# Patient Record
(demographics unavailable — no encounter records)

---

## 2024-12-11 NOTE — DISCUSSION/SUMMARY
[FreeTextEntry1] : Assessment - The patient experienced an episode of extreme fatigue and dyspnea, which led to hospitalization. The cause of these symptoms remains unclear, as no heart attack or arrhythmia was detected, and capsule endoscopy did not reveal any bleeding source. - Anemia was noted, but the underlying cause is still undetermined. The patient received a blood transfusion and intravenous magnesium during hospitalization. - There is a mention of congestive heart failure (CHF) in the patient's chart, but it is considered a result of another underlying issue, not a primary diagnosis. - The patient has a history of mitral valve prolapse with moderate mitral regurgitation and mitral annular calcification, but these are not identified as the direct cause of the current symptoms. - The Watchman device is well-anchored with a small communication between the device and the lateral wall of the left atrium, which is expected post-procedure. - The cardiologist plans to refer the patient to a heart failure specialist for further evaluation, as the episodes of dyspnea and fatigue have occurred multiple times without a clear explanation.  Plan - Discontinue sodium tablets and amlodipine as current blood pressure is stable. - Conduct blood work to monitor serum sodium levels in a couple of weeks. - Perform blood tests to check iron level and ferritin to assess anemia status. - Refer to Dr. Koehler in hematology for further evaluation and management of anemia. - Refer to Dr. Alejandra Green, a heart failure expert, for a comprehensive evaluation of recurrent episodes of shortness of breath and fatigue. - Ensure proper monitoring of the loop recorder to assess any potential arrhythmias or other cardiac events. - Consider re-interrogation of the pacemaker to ensure its proper functioning and to rule out any issues. - Initiate a new medication regimen to address current symptoms and evaluate its effectiveness in alleviating shortness of breath and fatigue.  Prescription - Discontinue amlodipine 2.5 mg - Discontinue Sodium tablets 1 g twice a day - Monitor serum sodium levels with blood work in a couple of weeks - Be aware of potential diarrhea from excessive magnesium intake  Appointments - Referral to Dr. Alejandra Green, heart failure expert at [place], for further evaluation.  My cumulative time spent on today's visit included: Preparation for the visit, review of the medical records, review of pertinent diagnostic studies, review and integration of laboratory data, coordination of care, examination and counseling of the patient on the above diagnosis, treatment plan and prognosis, orders of diagnostic tests and any additional lab data ordered today, medications and/or appropriate procedures and documentation in the medical records of today's visit.  ICD-10 codes (8) - Anemia, unspecified [D64.9] - Heart failure, unspecified [I50.9] - Nonrheumatic mitral (valve) prolapse [I34.1] - Nonrheumatic mitral (valve) annulus calcification [I34.81] - Nonrheumatic mitral (valve) insufficiency [I34.0] - Nonrheumatic tricuspid (valve) insufficiency [I36.1] - Essential (primary) hypertension [I10] - Hypothyroidism, unspecified [E03.9]

## 2024-12-11 NOTE — DISCUSSION/SUMMARY
[FreeTextEntry1] : Assessment - The patient experienced an episode of extreme fatigue and dyspnea, which led to hospitalization. The cause of these symptoms remains unclear, as no heart attack or arrhythmia was detected, and capsule endoscopy did not reveal any bleeding source. - Anemia was noted, but the underlying cause is still undetermined. The patient received a blood transfusion and intravenous magnesium during hospitalization. - There is a mention of congestive heart failure (CHF) in the patient's chart, but it is considered a result of another underlying issue, not a primary diagnosis. - The patient has a history of mitral valve prolapse with moderate mitral regurgitation and mitral annular calcification, but these are not identified as the direct cause of the current symptoms. - The Watchman device is well-anchored with a small communication between the device and the lateral wall of the left atrium, which is expected post-procedure. - The cardiologist plans to refer the patient to a heart failure specialist for further evaluation, as the episodes of dyspnea and fatigue have occurred multiple times without a clear explanation.  Plan - Discontinue sodium tablets and amlodipine as current blood pressure is stable. - Conduct blood work to monitor serum sodium levels in a couple of weeks. - Perform blood tests to check iron level and ferritin to assess anemia status. - Refer to Dr. Koehler in hematology for further evaluation and management of anemia. - Refer to Dr. Alejandra Green, a heart failure expert, for a comprehensive evaluation of recurrent episodes of shortness of breath and fatigue. - Ensure proper monitoring of the loop recorder to assess any potential arrhythmias or other cardiac events. - Consider re-interrogation of the pacemaker to ensure its proper functioning and to rule out any issues. - Initiate a new medication regimen to address current symptoms and evaluate its effectiveness in alleviating shortness of breath and fatigue.  Prescription - Discontinue amlodipine 2.5 mg - Discontinue Sodium tablets 1 g twice a day - Monitor serum sodium levels with blood work in a couple of weeks - Be aware of potential diarrhea from excessive magnesium intake  Appointments - Referral to Dr. Alejandra Green, heart failure expert at [place], for further evaluation.  My cumulative time spent on today's visit included: Preparation for the visit, review of the medical records, review of pertinent diagnostic studies, review and integration of laboratory data, coordination of care, examination and counseling of the patient on the above diagnosis, treatment plan and prognosis, orders of diagnostic tests and any additional lab data ordered today, medications and/or appropriate procedures and documentation in the medical records of today's visit.  ICD-10 codes (8) - Anemia, unspecified [D64.9] - Heart failure, unspecified [I50.9] - Nonrheumatic mitral (valve) prolapse [I34.1] - Nonrheumatic mitral (valve) annulus calcification [I34.81] - Nonrheumatic mitral (valve) insufficiency [I34.0] - Nonrheumatic tricuspid (valve) insufficiency [I36.1] - Essential (primary) hypertension [I10] - Hypothyroidism, unspecified [E03.9]

## 2024-12-11 NOTE — HISTORY OF PRESENT ILLNESS
[FreeTextEntry1] : TEJINDER MEAD  is a 79 year old F w/ pmhx of HLD, HTN, anxiety, Thal trait who presents today to establish cardiac care. Pt noted to have inverted T waves and ST depressions - unclear if this is a new findings.   The patient denies fever, chills, sore throat, loss of taste or smell, muscle aches weight loss, malaise, rash, recent travel, insect bites, alteration bowel habits, headaches, weakness, abdominal  pain, bloating, changes in urination, visual disturbances, shortness of breath, chest pain, dizziness, palpitations.  The patient is here for follow-up of and ongoing management  elevated cholesterol. Patient is currently tolerating medication and denies muscle pain, joint pain, back pain,  urinary changes , nausea, vomiting, abdominal pain or diarrhea. The patient is trying to follow a low cholesterol diet.  The patient here for evaluation of high blood pressure to review current therapy and to try to optimize patient's blood pressure based on established guidelines.. Patient is currently tolerating the current antihypertensive regime and they deny headaches, stiff neck, visual changes, or PND. The patient has been trying to stay on a low-sodium diet.

## 2025-01-15 NOTE — HISTORY OF PRESENT ILLNESS
[FreeTextEntry1] : TEJINDER MEAD is a 79 year old F w/ pmhx of HLD, HTN, anxiety, Thal trait who presents for 1 month follow up.  Pt was referred by abnormal EKG at last visit.  12/19/24- TTE- LVEF >75%, mild tr 12/19/24- STN- Normal myocardial perfusion scan, with no evidence of infarction or inducible ischemia. CT Calcium score ordered at well. Pt's lexapro was increased to 1.5 tablets a day for anxiety but she states she didn't notice much of a difference.   The patient denies fever, chills, sore throat, loss of taste or smell, muscle aches weight loss, malaise, rash, recent travel, insect bites, alteration bowel habits, headaches, weakness, abdominal pain, bloating, changes in urination, visual disturbances, shortness of breath, chest pain, dizziness, palpitations.  The patient presents for evaluation of high blood pressure. Patient is currently tolerating the current  antihypertensive regime and they deny headaches, stiff neck, visual changes, pedal Edema or PND. They also are here for follow-up of elevated cholesterol. Patient is currently tolerating medication and denies muscle pain, joint pain, back pain, tea colored urine ,nausea, vomiting, abdominal pain or diarrhea. The patient is trying to follow a low cholesterol diet.  s/p labs predm 6.2, ca 10.7

## 2025-05-01 NOTE — PHYSICAL EXAM
[Well Developed] : well developed [Well Nourished] : well nourished [No Acute Distress] : no acute distress [Normal Conjunctiva] : normal conjunctiva [Normal Venous Pressure] : normal venous pressure [No Carotid Bruit] : no carotid bruit [Clear Lung Fields] : clear lung fields [Good Air Entry] : good air entry [No Respiratory Distress] : no respiratory distress  [Soft] : abdomen soft [Non Tender] : non-tender [No Masses/organomegaly] : no masses/organomegaly [Normal Bowel Sounds] : normal bowel sounds [Normal Gait] : normal gait [No Edema] : no edema [No Cyanosis] : no cyanosis [No Clubbing] : no clubbing [No Varicosities] : no varicosities [No Rash] : no rash [No Skin Lesions] : no skin lesions [Moves all extremities] : moves all extremities [No Focal Deficits] : no focal deficits [Normal Speech] : normal speech [Alert and Oriented] : alert and oriented [Normal memory] : normal memory

## 2025-05-01 NOTE — HISTORY OF PRESENT ILLNESS
[FreeTextEntry1] : This is a 79 year y/o female with a PMHx of HTN, HLD, Thal trait, PreDM presents today for follow up.   , 73 percentile (2024)  s/p CT calcium score w/ incidental findings - moderate-sized hiatal hernia. A 4 mm nodule along the left major fissure on 4:55 may represent a small intrapulmonary lymph node. 12/19/24- TTE- LVEF >75%, mild tr 12/19/24- STN- Normal myocardial perfusion scan, with no evidence of infarction or inducible ischemia.  The patient is here for evaluation of high blood pressure. Currently tolerating antihypertensive medications. Denies headaches, stiff neck, visual changes, or PND. The patient has been trying to stay on a low-sodium diet.  The patient is here for follow-up of elevated cholesterol. Currently tolerating prescribed medications. Denies muscle pain, joint pain, back pain, urinary changes, nausea, vomiting, abdominal pain or diarrhea. The patient is trying to follow a low cholesterol diet.  The patient is also here for f/u of pre-diabetes noted on prior blood test. Attempting to follow a low carbohydrate diet and simple sugars. Denies polyphagia, polydipsia, polyuria or blurry vision.